# Patient Record
Sex: MALE | Race: ASIAN | NOT HISPANIC OR LATINO | URBAN - METROPOLITAN AREA
[De-identification: names, ages, dates, MRNs, and addresses within clinical notes are randomized per-mention and may not be internally consistent; named-entity substitution may affect disease eponyms.]

---

## 2019-09-03 ENCOUNTER — OFFICE (OUTPATIENT)
Dept: URBAN - METROPOLITAN AREA CLINIC 12 | Facility: CLINIC | Age: 54
End: 2019-09-03

## 2019-09-03 VITALS
HEIGHT: 70 IN | HEART RATE: 81 BPM | DIASTOLIC BLOOD PRESSURE: 87 MMHG | SYSTOLIC BLOOD PRESSURE: 145 MMHG | WEIGHT: 179 LBS

## 2019-09-03 DIAGNOSIS — R13.10 DYSPHAGIA, UNSPECIFIED: ICD-10-CM

## 2019-09-03 DIAGNOSIS — K21.9 GASTRO-ESOPHAGEAL REFLUX DISEASE WITHOUT ESOPHAGITIS: ICD-10-CM

## 2019-09-03 DIAGNOSIS — E11.9 TYPE 2 DIABETES MELLITUS WITHOUT COMPLICATIONS: ICD-10-CM

## 2019-09-03 DIAGNOSIS — R19.5 OTHER FECAL ABNORMALITIES: ICD-10-CM

## 2019-09-03 PROCEDURE — 99204 OFFICE O/P NEW MOD 45 MIN: CPT | Performed by: INTERNAL MEDICINE

## 2019-09-03 NOTE — SERVICENOTES
As the patient has never had a colonoscopy in given that he is fecal occult blood positive we will go ahead and proceed with diagnostic colonoscopy.  We will perform EGD at the same time because he is positive for occult blood as his long-term reflux as well as history of dysphagia.  It does not appear that he is having active dysphagia at this time but apparently has had dysphagia to solid food in the past requiring EGD elsewhere.  We will try to obtain these records.  He was instructed to notify us if he has any new or worsening symptoms in the meantime.  I did discuss the importance of making sure that he stays on a clear liquid diet prior to the procedure. He states that he does have some occasional issues if he does not eat before 10 o'clock but hopefully using clear liquids will be able to prevent this.  He should let us know if he has any problems with the diet and we will try to get him scheduled for his procedures first thing in the morning.

## 2019-09-19 ENCOUNTER — AMBULATORY SURGICAL CENTER (OUTPATIENT)
Dept: URBAN - METROPOLITAN AREA SURGERY 2 | Facility: SURGERY | Age: 54
End: 2019-09-19

## 2025-01-07 NOTE — SERVICEHPINOTES
Mr. Russ is a 54-year-old man here for evaluation of occult blood positive stool. The patient was referred by his PCP who checked stool for occult blood in July 2019 and all three were positive. He does have diabetes and takes metformin. He states that if he does not eat something before 10 o'clock he will have some shakes but states that his blood sugars usually normal at that time. He denies any first-degree family history of colon cancer colon polyps. He does have longstanding reflux for which he takes omeprazole 40 mg a day prescribed by his PCP. He was recently seen at the hospital because of fear of blood sugar issues at that time his blood work was all normal including normal hematocrit, MCV, glucose, liver enzymes, etc. He denies any issues with chest pain or shortness of breath. He does do this like to be 30 minutes a day. He states that he has had an EGD in the past because of some dysphagia symptoms and is no longer having any dysphagia symptoms at this time. He has never had a colonoscopy. He is average risk for colon cancer. 47.9